# Patient Record
Sex: MALE | Race: WHITE
[De-identification: names, ages, dates, MRNs, and addresses within clinical notes are randomized per-mention and may not be internally consistent; named-entity substitution may affect disease eponyms.]

---

## 2017-02-13 ENCOUNTER — HOSPITAL ENCOUNTER (OUTPATIENT)
Dept: HOSPITAL 80 - BMCIMAGING | Age: 77
End: 2017-02-13
Attending: INTERNAL MEDICINE
Payer: COMMERCIAL

## 2017-02-13 DIAGNOSIS — I10: ICD-10-CM

## 2017-02-13 DIAGNOSIS — I70.8: Primary | ICD-10-CM

## 2017-02-13 NOTE — US
Bilateral Duplex/Doppler Carotid Sonography



Clinical Indications:  Hypertension, previous vascular surgery, previous transient ischemic attack, h
eart/vascular disease. R09.89



Technique:  The cervical portions of the carotid and vertebral arteries were imaged and interrogated 
by color and pulsed Duplex/Doppler.  Spectral analysis was performed.



Findings:



Right Carotid:

Right CCA peak systolic velocity = 97 cm/sec

Right ICA peak systolic velocity = 84 cm/sec

Right ECA peak systolic velocity = 104 cm/sec

Right ICA/CCA systolic velocity ratio = 0.86



No flow-limiting carotid stenosis. Mild calcified plaque involving the right carotid bulb and proxima
l right internal carotid artery.



Left Carotid:

Left CCA peak systolic velocity = 93 cm/sec

Left ICA peak systolic velocity = 76 cm/sec

Left ECA peak systolic velocity = 106 cm/sec

Left ICA/CCA systolic velocity ratio = 0.82



No flow-limiting carotid stenosis. Minimal calcified plaque involving the left carotid bulb and proxi
mal left internal carotid artery.



Vertebral Arteries:  Antegrade flow is shown by pulsed Doppler of each vertebral artery.



Impression:



1. No evidence of flow-limiting carotid stenosis.



2. Mild atherosclerotic disease bilateral carotid bulbs.



3. Bilateral vertebral arteries are patent with antegrade flow.



Measurement of carotid stenosis is based on velocity parameters that correlate the residual internal 
carotid diameter with [place] [employer] Symptomatic Carotid Endarterectomy Trial (NASCET) based stenosis
 levels.

## 2019-02-26 ENCOUNTER — HOSPITAL ENCOUNTER (OUTPATIENT)
Dept: HOSPITAL 80 - BHFA | Age: 79
End: 2019-02-26
Attending: INTERNAL MEDICINE
Payer: COMMERCIAL

## 2019-02-26 DIAGNOSIS — Z95.2: Primary | ICD-10-CM
